# Patient Record
Sex: FEMALE
[De-identification: names, ages, dates, MRNs, and addresses within clinical notes are randomized per-mention and may not be internally consistent; named-entity substitution may affect disease eponyms.]

---

## 2019-02-02 ENCOUNTER — HOSPITAL ENCOUNTER (EMERGENCY)
Dept: HOSPITAL 5 - ED | Age: 79
Discharge: HOME | End: 2019-02-02
Payer: MEDICARE
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
## 2019-02-02 DIAGNOSIS — S00.31XA: Primary | ICD-10-CM
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
## 2019-02-02 DIAGNOSIS — Z88.5: ICD-10-CM

## 2019-02-02 DIAGNOSIS — M25.512: ICD-10-CM
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
## 2019-02-02 DIAGNOSIS — M25.522: ICD-10-CM

## 2019-02-02 DIAGNOSIS — R51: ICD-10-CM

## 2019-02-02 PROCEDURE — 70450 CT HEAD/BRAIN W/O DYE: CPT

## 2019-02-02 PROCEDURE — 90471 IMMUNIZATION ADMIN: CPT

## 2019-02-02 PROCEDURE — 70486 CT MAXILLOFACIAL W/O DYE: CPT

## 2019-02-02 PROCEDURE — A6250 SKIN SEAL PROTECT MOISTURIZR: HCPCS

## 2019-02-02 PROCEDURE — 99285 EMERGENCY DEPT VISIT HI MDM: CPT

## 2019-02-02 PROCEDURE — 72125 CT NECK SPINE W/O DYE: CPT

## 2019-02-02 PROCEDURE — 90715 TDAP VACCINE 7 YRS/> IM: CPT

## 2019-02-02 NOTE — XRAY REPORT
FINAL REPORT



EXAM:  XR HUMERUS 2+V LT



HISTORY:  slip and fall, left arm pain 



TECHNIQUE:  Left humerus, two views



PRIORS:  None.



FINDINGS:  

There is no fracture identified. There is no dislocation at the shoulder. There is some spurring at t
he glenohumeral joint. 



IMPRESSION:  

There is no acute abnormality identified.

## 2019-02-02 NOTE — CAT SCAN REPORT
FINAL REPORT



EXAM:  CT HEAD/BRAIN WO CON



HISTORY:  slip and fall, facial pain 



TECHNIQUE:  CT of the head was performed.  No intravenous contrast was administered.





PRIORS:  None.



FINDINGS:  

There is no evidence of intracranial hemorrhage.



There is no edema, mass effect or midline shift.



There are no abnormal extra-axial fluid collections.



The ventricles are appropriate for brain volume.



There is no skull fracture seen.



The visualized aspects of the sinuses are clear.



IMPRESSION:  

There is no acute intracranial abnormality identified.

## 2019-02-02 NOTE — CAT SCAN REPORT
FINAL REPORT



EXAM:  CT FACIAL BONES WO CON



HISTORY:  slip and fall, facial pain 



TECHNIQUE:  Axial images and coronal and sagittal reformatted images of the face/facial bones were ob
tained.







PRIORS:  None.



FINDINGS:  

The paranasal sinuses are clear. There is no facial bone fracture seen.



IMPRESSION:  

There is no acute abnormality identified.

## 2019-02-02 NOTE — XRAY REPORT
FINAL REPORT



EXAM:  XR ELBOW 2V LT



HISTORY:  slip and fall, left arm pain 



TECHNIQUE:  Three views of the left elbow 



PRIORS:  None.



FINDINGS:  

There is a tiny calcification adjacent to lateral epicondyle. Tiny avulsion injury not excluded altho
ugh this may just relate to old injury or calcific tendinitis. 



There is no evidence of joint dislocation.



There is no elbow fat pad elevation seen.



For



IMPRESSION:  

Small calcification adjacent to lateral epicondyle. This could be a small avulsion injury of unknown 
age or may relate to calcific tendinitis. Correlate for tenderness.

## 2019-02-02 NOTE — CAT SCAN REPORT
FINAL REPORT



EXAM:  CT CERVICAL SPINE WO CON



HISTORY:  slip and fall, facial pain 



TECHNIQUE:  A noncontrast CT of the cervical spine was performed.  Coronal and sagittal reformatted i
mages were obtained.







PRIORS:  None.



FINDINGS:  

There is no cervical spine fracture seen. Vertebral heights and alignment are maintained. There is C5
-6 moderate to marked disc space narrowing with prominent endplate spurring. Disc osteophyte complex 
causes mild spinal stenosis and mild-to-moderate right lateral recess effacement and right neuroforam
inal narrowing.



There is also degenerative disc disease at C6-7 but no significant spinal stenosis.







IMPRESSION:  

There is no evidence of cervical spine fracture or subluxation.

Lower cervical degenerative disc disease with findings as described above.

## 2019-02-02 NOTE — EMERGENCY DEPARTMENT REPORT
ED Fall HPI





- General


Chief Complaint: Fall


Stated Complaint: FALL


Time Seen by Provider: 02/02/19 10:59


Source: patient, family, EMS (ems notes not available at time of  chart 

dictation)


Mode of arrival: Stretcher


Limitations: Physical Limitation





- History of Present Illness


Initial Comments: 





This is a 78-year-old female, right-hand dominant, who presents to the emergency

room after mechanical fall.  She slipped, and landed on her nose, and twisted 

her left elbow, left bicep.  She has a nasal abrasion, but otherwise no other 

supraclavicular injuries.  She denies midline neck pain, midline chest pain, 

abdominal pain, weakness, numbness, shortness of breath.





She does not believe she lost consciousness.  She has left bicep pain, which is 

sharp and achy, increases with palpation, decreases with rest, does not radiate 

anywhere.


MD Complaint: fall


-: Sudden


Fall From: standing


When Fall Occurred: 1-3 hours PTA


Fall Witnessed: yes, by family


Place Fall Occurred: home


Loss of Consciousness: none


Prolonged Down Time?: no


Symptoms Prior to Fall: none


Location: head, other


Location - Extremities: Left: Arm


Severity: moderate


Quality: other


Context: tripped/slipped


Associated Symptoms: other (as per history of present illness).  denies: neck 

pain, numbness, weakness, chest paint, shortness of breath, abdominal pain, 

hematuria, unable to walk, lightheaded, vertigo, confusion





- Related Data


                                  Previous Rx's











 Medication  Instructions  Recorded  Last Taken  Type


 


Acetaminophen [Tylenol] 325 mg PO Q4HR PRN #30 capsule 02/02/19 Unknown Rx


 


Bacitracin Zinc Oint [Antibiotic 1 applicatio TP BID #1 tube 02/02/19 Unknown Rx





Oint]    


 


Ibuprofen [Motrin] 400 mg PO Q8H PRN #30 tablet 02/02/19 Unknown Rx











                                    Allergies











Allergy/AdvReac Type Severity Reaction Status Date / Time


 


morphine Allergy  Unknown Verified 02/02/19 11:17














ED Review of Systems


ROS: 


Stated complaint: FALL


Other details as noted in HPI





Constitutional: denies: fever


Eyes: denies: vision change


ENT: other (nasal abrasion).  denies: ear pain


Respiratory: denies: cough


Cardiovascular: denies: chest pain, palpitations


Gastrointestinal: denies: abdominal pain, nausea, vomiting


Genitourinary: denies: urgency, dysuria


Musculoskeletal: arthralgia, myalgia.  denies: back pain


Skin: lesions


Neurological: denies: weakness, numbness, paresthesias, confusion


Psychiatric: denies: anxiety





ED Past Medical Hx





- Medications


Home Medications: 


                                Home Medications











 Medication  Instructions  Recorded  Confirmed  Last Taken  Type


 


Acetaminophen [Tylenol] 325 mg PO Q4HR PRN #30 capsule 02/02/19  Unknown Rx


 


Bacitracin Zinc Oint [Antibiotic 1 applicatio TP BID #1 tube 02/02/19  Unknown 

Rx





Oint]     


 


Ibuprofen [Motrin] 400 mg PO Q8H PRN #30 tablet 02/02/19  Unknown Rx














ED Physical Exam





- General


Limitations: No Limitations


General appearance: alert, anxious





- Head


Head exam: Present: normocephalic, other (there is a superficial nasal abrasion,

 avulsion noted, with no streaking or pus or crepitus.  There is no nasal septal

 hematoma.  There is no hemotympanum)





- Eye


Eye exam: Present: normal appearance, EOMI (pupil status post bilateral cataract

 repair.), other (visual acuity intact to finger counting, color perception, 

reading at a close distance)





- ENT


ENT exam: Present: normal orophraynx, mucous membranes moist, TM's normal bila

terally, normal external ear exam, other (there is no mastoid tenderness)





- Neck


Neck exam: Present: normal inspection, full ROM.  Absent: tenderness, meningismu

s





- Respiratory


Respiratory exam: Present: normal lung sounds bilaterally.  Absent: respiratory 

distress, wheezes, rales, rhonchi, stridor, chest wall tenderness, accessory 

muscle use, decreased breath sounds, prolonged expiratory





- Cardiovascular


Cardiovascular Exam: Present: regular rate, normal rhythm, normal heart sounds. 

 Absent: bradycardia, tachycardia, irregular rhythm, systolic murmur, diastolic 

murmur, rubs, gallop





- GI/Abdominal


GI/Abdominal exam: Present: soft.  Absent: distended, tenderness, guarding, 

rebound, rigid, pulsatile mass





- Extremities Exam


Extremities exam: Present: normal inspection, full ROM, tenderness (there is 

left bicep tenderness.), other (2+ pulses noted in the bilateral upper, lower 

extremities.  Compartments soft.  No long bony tenderness.  The pelvis is 

stable.).  Absent: pedal edema, joint swelling, calf tenderness





- Back Exam


Back exam: Present: normal inspection, full ROM.  Absent: tenderness, CVA 

tenderness (R), paraspinal tenderness, vertebral tenderness





- Neurological Exam


Neurological exam: Present: alert, oriented X3, other (Extraocular movements 

intact.  Tongue midline.  No facial droop.  Facial sensation intact to light 

touch in the V1, V2, V3 distribution bilaterally.  5 and 5 strength in 4 

extremities..  Sensation is intact to light touch in 4 extremities.).  Absent: 

motor sensory deficit





- Psychiatric


Psychiatric exam: Present: normal affect, normal mood, anxious





- Skin


Skin exam: Present: warm, abrasion





ED Course


                                   Vital Signs











  02/02/19 02/02/19 02/02/19





  10:50 10:57 11:00


 


Temperature 97.7 F  


 


Pulse Rate 92 H  


 


Respiratory 18  





Rate   


 


Blood Pressure 106/52 106/52 106/52


 


O2 Sat by Pulse 97  97





Oximetry   














  02/02/19 02/02/19 02/02/19





  12:10 12:15 12:27


 


Temperature   


 


Pulse Rate   


 


Respiratory   18





Rate   


 


Blood Pressure 106/52 121/67 


 


O2 Sat by Pulse 98 96 





Oximetry   














  02/02/19 02/02/19 02/02/19





  12:30 12:45 13:00


 


Temperature   


 


Pulse Rate   


 


Respiratory 18  





Rate   


 


Blood Pressure 119/69 123/64 141/71


 


O2 Sat by Pulse 98 97 97





Oximetry   














  02/02/19 02/02/19





  13:15 13:30


 


Temperature  


 


Pulse Rate  


 


Respiratory  





Rate  


 


Blood Pressure 137/67 132/59


 


O2 Sat by Pulse 96 





Oximetry  














- Reevaluation(s)


Reevaluation #1: 





02/02/19 13:08


Differential diagnosis, including but not limited to: Superficial abrasion, skin

 avulsion, intracranial injury, cervical spine injury, facial injury, muscular 

sprain, strain





Assessment and plan: 78-year-old female status post mechanical fall.





The patient is afebrile, clinically sober, with a Laurie Coma Scale of 15.  

Noncontrast CT scan of the brain, cervical spine negative for acute disease.  CT

 scan of the facial bones pending.





X-ray of the elbow, humerus, shoulder negative for acute disease, there may be s

ome arthritic changes in the left shoulder.  Patient has some muscular 

tenderness in the left bicep, without evidence of compartment syndrome, without 

evidence of distal neurovascular compromise.  Thumb opposition, wrist range of 

motion intact in the bilateral upper extremities, sensation intact to light 

touch in the bilateral deltoid, median, radial, ulnar distribution.





Left upper extremity will be placed in a sling, patient's pain will be treated 

with acetaminophen at her request, and nasal avulsion will be cleaned and 

dressed by nursing staff.


Reevaluation #2: 





02/02/19 13:56


Noncontrast CT scan of the facial bones negative for acute disease.





X-ray of the shoulder demonstrates chronic-appearing rotator cuff injury, 

patient corroborates a chronic rotator cuff injury which is not acutely 

asymptomatic.  She was informed about possible avulsion fracture in


The elbow, placed in a sling, and instructed to follow up with outpatient 

orthopedics.  The patient is smiling, in good spirits, and in no acute distress 

at this time














ED Medical Decision Making





- Lab Data








                                   Vital Signs











  02/02/19 02/02/19 02/02/19





  10:50 10:57 11:00


 


Temperature 97.7 F  


 


Pulse Rate 92 H  


 


Respiratory 18  





Rate   


 


Blood Pressure 106/52 106/52 106/52


 


O2 Sat by Pulse 97  97





Oximetry   














  02/02/19 02/02/19 02/02/19





  12:10 12:15 12:27


 


Temperature   


 


Pulse Rate   


 


Respiratory   18





Rate   


 


Blood Pressure 106/52 121/67 


 


O2 Sat by Pulse 98 96 





Oximetry   














  02/02/19 02/02/19





  12:30 12:45


 


Temperature  


 


Pulse Rate  


 


Respiratory  





Rate  


 


Blood Pressure 119/69 123/64


 


O2 Sat by Pulse 96 97





Oximetry  














- Radiology Data


Radiology results: report reviewed, image reviewed


interpreted by me: 





X-ray of the left elbow, left humerus, left shoulder negative for acute 

traumatic disease.  There are left proximal humerus arthritic changes noted.





Noncontrast CT scan of the brain, cervical spine negative for acute disease.





CT scan of the facial bones:


Critical care attestation.: 


If time is entered above; I have spent that time in minutes in the direct care 

of this critically ill patient, excluding procedure time.








ED Disposition


Clinical Impression: 


 Fall, Skin avulsion





Disposition: DC-01 TO HOME OR SELFCARE


Is pt being admited?: No


Does the pt Need Aspirin: No


Condition: Good


Instructions:  Skin Avulsion (ED), Fall Prevention (ED)


Additional Instructions: 


Rest, and avoid heavy lifting.  Avoid strenuous physical activities.  Take pain 

medication as needed/directed.  Pain typically gets worse before he gets better 

after mechanical fall.  Please wash facial abrasion with gentle soap and water 

at least once every 24 hours.  Keep the left arm in a sling, and begin 

activities, range of motion is physically tolerated.  He should probably resolve

 within the next 7-10 days.  However, if left upper extremity pain persists for 

greater than 7-10 days, please follow up with a primary care doctor or 

orthopedic physician within the next 3 weeks.





Return to the emergency room right away with new pain, worsened pain, migration 

of pain, projectile vomiting, change in mental status, confusion, inability to 

speak, inability to breathe, it, worsening or different symptoms.


Prescriptions: 


Acetaminophen [Tylenol] 325 mg PO Q4HR PRN #30 capsule


 PRN Reason: Pain , Severe (7-10)


Bacitracin Zinc Oint [Antibiotic Oint] 1 applicatio TP BID #1 tube


Ibuprofen [Motrin] 400 mg PO Q8H PRN #30 tablet


 PRN Reason: Pain , Severe (7-10)


Referrals: 


[facility] [Provider Group] - 7-10 days


[facility] [Provider Group] - as needed

## 2019-02-02 NOTE — XRAY REPORT
FINAL REPORT



EXAM:  XR SHOULDER 2+V LT



HISTORY:  slip and fall, left arm pain  



TECHNIQUE:  Three views of the left shoulder



PRIORS:  None.



FINDINGS:  

There is no evidence of acute fracture.



There is no evidence of joint dislocation.



The humeral head is high riding with narrowed subacromial space. This appearance suggests chronic rot
ator cuff tear.



IMPRESSION:  

High riding humeral head and narrowing of the subacromial space. This appearance its is suggestive of
 chronic rotator cuff tear. No acute abnormality seen.

## 2019-02-03 VITALS — SYSTOLIC BLOOD PRESSURE: 132 MMHG | DIASTOLIC BLOOD PRESSURE: 59 MMHG
